# Patient Record
Sex: MALE | Race: WHITE | NOT HISPANIC OR LATINO | Employment: FULL TIME | URBAN - METROPOLITAN AREA
[De-identification: names, ages, dates, MRNs, and addresses within clinical notes are randomized per-mention and may not be internally consistent; named-entity substitution may affect disease eponyms.]

---

## 2019-03-29 ENCOUNTER — OFFICE VISIT (OUTPATIENT)
Dept: FAMILY MEDICINE CLINIC | Facility: CLINIC | Age: 24
End: 2019-03-29

## 2019-03-29 VITALS
DIASTOLIC BLOOD PRESSURE: 72 MMHG | HEIGHT: 74 IN | RESPIRATION RATE: 16 BRPM | SYSTOLIC BLOOD PRESSURE: 100 MMHG | OXYGEN SATURATION: 100 % | BODY MASS INDEX: 20.02 KG/M2 | WEIGHT: 156 LBS | TEMPERATURE: 97.7 F | HEART RATE: 95 BPM

## 2019-03-29 DIAGNOSIS — Q67.6 CONGENITAL PECTUS EXCAVATUM: ICD-10-CM

## 2019-03-29 DIAGNOSIS — Z00.00 ROUTINE GENERAL MEDICAL EXAMINATION AT A HEALTH CARE FACILITY: Primary | ICD-10-CM

## 2019-03-29 DIAGNOSIS — Z00.00 LABORATORY EXAMINATION ORDERED AS PART OF A ROUTINE GENERAL MEDICAL EXAMINATION: ICD-10-CM

## 2019-03-29 PROCEDURE — 99202 OFFICE O/P NEW SF 15 MIN: CPT | Performed by: PHYSICIAN ASSISTANT

## 2019-03-29 NOTE — PROGRESS NOTES
Subjective   Renaldo Worrell is a 23 y.o. male.     History of Present Illness   Renaldo Worrell 23 y.o. male who presents today for a new patient appointment.    he has a history of There is no problem list on file for this patient.  .  he is here to establish care I reviewed the PFSH recorded today by my MA/LPN staff.   he   He has been feeling well.      The following portions of the patient's history were reviewed and updated as appropriate: allergies, current medications, past family history, past medical history, past social history, past surgical history and problem list.    Review of Systems   Constitutional: Negative for activity change, appetite change and unexpected weight change.   HENT: Negative for nosebleeds and trouble swallowing.    Eyes: Negative for pain and visual disturbance.   Respiratory: Negative for chest tightness, shortness of breath and wheezing.    Cardiovascular: Negative for chest pain and palpitations.   Gastrointestinal: Negative for abdominal pain and blood in stool.   Endocrine: Negative.    Genitourinary: Negative for difficulty urinating and hematuria.   Musculoskeletal: Negative for joint swelling.   Skin: Negative for color change and rash.   Allergic/Immunologic: Negative.    Neurological: Negative for syncope and speech difficulty.   Hematological: Negative for adenopathy.   Psychiatric/Behavioral: Negative for agitation and confusion.   All other systems reviewed and are negative.      Objective   Physical Exam   Constitutional: He is oriented to person, place, and time. He appears well-developed and well-nourished. No distress.   HENT:   Head: Normocephalic and atraumatic.   Right Ear: External ear normal.   Left Ear: External ear normal.   Nose: Nose normal.   Mouth/Throat: Oropharynx is clear and moist. No oropharyngeal exudate.   Mild pnd   Eyes: Conjunctivae and EOM are normal. Pupils are equal, round, and reactive to light. Right eye exhibits no discharge. Left eye  exhibits no discharge. No scleral icterus.   Neck: Normal range of motion. Neck supple. No tracheal deviation present. No thyromegaly present.   Cardiovascular: Normal rate, regular rhythm, normal heart sounds, intact distal pulses and normal pulses. Exam reveals no gallop.   No murmur heard.  Pulmonary/Chest: Effort normal and breath sounds normal. No respiratory distress. He has no wheezes. He has no rales.   Abdominal: Soft. Bowel sounds are normal. There is no tenderness.   Musculoskeletal: Normal range of motion. He exhibits deformity. He exhibits no edema or tenderness.   Pectus excavatum     Lymphadenopathy:     He has no cervical adenopathy.   Neurological: He is alert and oriented to person, place, and time. He exhibits normal muscle tone. Coordination normal.   Skin: Skin is warm and dry. No rash noted. He is not diaphoretic. No erythema. No pallor.   Psychiatric: He has a normal mood and affect. His behavior is normal. Judgment and thought content normal.   Nursing note and vitals reviewed.      Assessment/Plan   Renaldo was seen today for establish care.    Diagnoses and all orders for this visit:    Routine general medical examination at a health care facility    Congenital pectus excavatum

## 2019-03-29 NOTE — PATIENT INSTRUCTIONS
Low glycemic index diet  Exercise 30 minutes most days of the week  Make sure you get results on any labs or tests we ordered today  We discussed medications and how to take them as prescribed  Sleep 6-8 hours each night if possible  If you have not signed up for [facility], please activate your code ASAP from your After Visit Summary today    LDL goal <100  LDL goal if heart disease <70  HDL goal >60  Triglyceride goal <150  BP goal =<130/80  Fasting glucose <100

## 2019-03-30 LAB
25(OH)D3+25(OH)D2 SERPL-MCNC: 32.6 NG/ML (ref 30–100)
ALBUMIN SERPL-MCNC: 5 G/DL (ref 3.5–5.2)
ALBUMIN/GLOB SERPL: 2.2 G/DL
ALP SERPL-CCNC: 59 U/L (ref 39–117)
ALT SERPL-CCNC: 14 U/L (ref 1–41)
AST SERPL-CCNC: 19 U/L (ref 1–40)
BASOPHILS # BLD AUTO: 0.02 10*3/MM3 (ref 0–0.2)
BASOPHILS NFR BLD AUTO: 0.5 % (ref 0–1.5)
BILIRUB SERPL-MCNC: 0.7 MG/DL (ref 0.2–1.2)
BUN SERPL-MCNC: 13 MG/DL (ref 6–20)
BUN/CREAT SERPL: 13.1 (ref 7–25)
CALCIUM SERPL-MCNC: 9.3 MG/DL (ref 8.6–10.5)
CHLORIDE SERPL-SCNC: 99 MMOL/L (ref 98–107)
CHOLEST SERPL-MCNC: 134 MG/DL (ref 0–200)
CO2 SERPL-SCNC: 26.9 MMOL/L (ref 22–29)
CREAT SERPL-MCNC: 0.99 MG/DL (ref 0.76–1.27)
EOSINOPHIL # BLD AUTO: 0.1 10*3/MM3 (ref 0–0.4)
EOSINOPHIL NFR BLD AUTO: 2.4 % (ref 0.3–6.2)
ERYTHROCYTE [DISTWIDTH] IN BLOOD BY AUTOMATED COUNT: 12.5 % (ref 12.3–15.4)
GLOBULIN SER CALC-MCNC: 2.3 GM/DL
GLUCOSE SERPL-MCNC: 93 MG/DL (ref 65–99)
HCT VFR BLD AUTO: 47.2 % (ref 37.5–51)
HDLC SERPL-MCNC: 49 MG/DL (ref 40–60)
HGB BLD-MCNC: 15.2 G/DL (ref 13–17.7)
IMM GRANULOCYTES # BLD AUTO: 0.01 10*3/MM3 (ref 0–0.05)
IMM GRANULOCYTES NFR BLD AUTO: 0.2 % (ref 0–0.5)
LDLC SERPL CALC-MCNC: 76 MG/DL (ref 0–100)
LYMPHOCYTES # BLD AUTO: 1.73 10*3/MM3 (ref 0.7–3.1)
LYMPHOCYTES NFR BLD AUTO: 41.4 % (ref 19.6–45.3)
MCH RBC QN AUTO: 30.9 PG (ref 26.6–33)
MCHC RBC AUTO-ENTMCNC: 32.2 G/DL (ref 31.5–35.7)
MCV RBC AUTO: 95.9 FL (ref 79–97)
MONOCYTES # BLD AUTO: 0.4 10*3/MM3 (ref 0.1–0.9)
MONOCYTES NFR BLD AUTO: 9.6 % (ref 5–12)
NEUTROPHILS # BLD AUTO: 1.92 10*3/MM3 (ref 1.4–7)
NEUTROPHILS NFR BLD AUTO: 45.9 % (ref 42.7–76)
NRBC BLD AUTO-RTO: 0 /100 WBC (ref 0–0)
PLATELET # BLD AUTO: 191 10*3/MM3 (ref 140–450)
POTASSIUM SERPL-SCNC: 4.5 MMOL/L (ref 3.5–5.2)
PROT SERPL-MCNC: 7.3 G/DL (ref 6–8.5)
RBC # BLD AUTO: 4.92 10*6/MM3 (ref 4.14–5.8)
SODIUM SERPL-SCNC: 139 MMOL/L (ref 136–145)
T3FREE SERPL-MCNC: 3.7 PG/ML (ref 2–4.4)
T4 FREE SERPL-MCNC: 1.67 NG/DL (ref 0.93–1.7)
TRIGL SERPL-MCNC: 47 MG/DL (ref 0–150)
TSH SERPL DL<=0.005 MIU/L-ACNC: 1.18 MIU/ML (ref 0.27–4.2)
UNABLE TO VOID: NORMAL
VLDLC SERPL CALC-MCNC: 9.4 MG/DL (ref 5–40)
WBC # BLD AUTO: 4.18 10*3/MM3 (ref 3.4–10.8)

## 2020-07-07 ENCOUNTER — HOSPITAL ENCOUNTER (EMERGENCY)
Facility: HOSPITAL | Age: 25
Discharge: HOME | End: 2020-07-07
Attending: EMERGENCY MEDICINE | Admitting: EMERGENCY MEDICINE

## 2020-07-07 VITALS
TEMPERATURE: 98.1 F | OXYGEN SATURATION: 99 % | SYSTOLIC BLOOD PRESSURE: 128 MMHG | HEART RATE: 113 BPM | RESPIRATION RATE: 12 BRPM | DIASTOLIC BLOOD PRESSURE: 81 MMHG

## 2020-07-07 DIAGNOSIS — S00.83XA CONTUSION OF FACE, INITIAL ENCOUNTER: Primary | ICD-10-CM

## 2020-07-07 PROCEDURE — 99282 EMERGENCY DEPT VISIT SF MDM: CPT

## 2020-07-07 NOTE — ED NOTES
Pt reports he was at work when he was starting a weed eater and hit himself in the face under his right eye. Pt reports his face started to mildly swell when he went home, and thought he should get it checked out. Pt wearing mask upon arrival to ER. Triage staff wearing PPE per protocol throughout pt encounter.      Domonique Michelle RN  07/07/20 [id number]

## 2020-07-07 NOTE — ED PROVIDER NOTES
EMERGENCY DEPARTMENT ENCOUNTER    Room Number:  Room/bed info not found  Date of encounter:  7/7/2020  PCP: Sharon Nuñez PA-C  Historian: Patient, mother      HPI:  Chief Complaint: Facial contusion  A complete HPI/ROS/PMH/PSH/SH/FH are unobtainable due to: None    Context: Renaldo Worrell is a 24 y.o. male who presents to the ED c/o contusion to the right face.  This occurred around 1 PM when he was trying to use a weedeater at work.  Pain is mild.  It is constant.  Worse with palpation.  No change in vision.  No loss consciousness.  Takes no anticoagulants.  No vomiting.        PAST MEDICAL HISTORY  Active Ambulatory Problems     Diagnosis Date Noted   • No Active Ambulatory Problems     Resolved Ambulatory Problems     Diagnosis Date Noted   • No Resolved Ambulatory Problems     No Additional Past Medical History         PAST SURGICAL HISTORY  No past surgical history on file.      FAMILY HISTORY  Family History   Problem Relation Age of Onset   • Hypertension Father    • Asthma Brother    • Diabetes Maternal Grandmother    • Depression Maternal Grandmother    • Asthma Brother          SOCIAL HISTORY  Social History     Socioeconomic History   • Marital status: Single     Spouse name: Not on file   • Number of children: Not on file   • Years of education: Not on file   • Highest education level: Not on file   Tobacco Use   • Smoking status: Never Smoker   • Smokeless tobacco: Never Used   Substance and Sexual Activity   • Alcohol use: Defer   • Drug use: No   • Sexual activity: Defer         ALLERGIES  Patient has no known allergies.        REVIEW OF SYSTEMS  Review of Systems     All systems reviewed and negative except for those discussed in HPI.       PHYSICAL EXAM    I have reviewed the triage vital signs and nursing notes.    ED Triage Vitals [07/07/20 1648]   Temp Heart Rate Resp BP SpO2   98.8 °F (37.1 °C) 113 14 128/81 99 %      Temp src Heart Rate Source Patient Position BP Location FiO2 (%)   Tympanic  Monitor -- -- --       Physical Exam  GENERAL: not distressed  HENT: nares patent, no bleeding to the naris, good jaw occlusion  EYES: no scleral icterus, EOMI, no diplopia, PERRL, right periocular ecchymosis  CV: regular rhythm, regular rate  RESPIRATORY: normal effort  ABDOMEN: soft  MUSCULOSKELETAL: no deformity  NEURO: alert, moves all extremities, follows commands  SKIN: warm, dry        LAB RESULTS  No results found for this or any previous visit (from the past 24 hour(s)).    Ordered the above labs and independently reviewed the results.        RADIOLOGY  No Radiology Exams Resulted Within Past 24 Hours    I ordered the above noted radiological studies. Reviewed by me and discussed with radiologist.  See dictation for official radiology interpretation.      PROCEDURES    Procedures      MEDICATIONS GIVEN IN ER    Medications - No data to display      PROGRESS, DATA ANALYSIS, CONSULTS, AND MEDICAL DECISION MAKING    All labs have been independently reviewed by me.  All radiology studies have been reviewed by me and discussed with radiologist dictating the report.   EKG's independently viewed and interpreted by me.  Discussion below represents my analysis of pertinent findings related to patient's condition, differential diagnosis, treatment plan and final disposition.    I offered the patient a CT scan but discussed with him the recommendations from [place] CT head criteria.  I also have low suspicion for any kind of orbital blowout fracture given the absence of diplopia and essentially no tenderness to palpation of his face.  He has normal jaw occlusion.    The patient's mother prefer that he have a CT scan.  However, the patient prefers avoiding CT scan and monitoring this.  I defer to the patient's autonomy.  Given good return precautions.           PPE: Both the patient and I wore a surgical mask throughout the entire patient encounter.     AS OF 17:13 VITALS:    BP - 128/81  HR - 113  TEMP - 98.8 °F  (37.1 °C) (Tympanic)  O2 SATS - 99%        DIAGNOSIS  Final diagnoses:   Contusion of face, initial encounter         DISPOSITION  DISCHARGE    FOLLOW-UP  Sharon Nuñez, PADianaC  [id number] [id number] [phone]    Schedule an appointment as soon as possible for a visit   As needed    [facility] Emergency Department  [address] [facility] [address] [phone]  Go to   Immediately if you develop any change in your vision, vomiting, or increased pain         Medication List      No changes were made to your prescriptions during this visit.                Raúl Nuñez II, MD  07/07/20 9158

## 2020-11-04 ENCOUNTER — OFFICE VISIT (OUTPATIENT)
Dept: FAMILY MEDICINE CLINIC | Facility: CLINIC | Age: 25
End: 2020-11-04

## 2020-11-04 VITALS
DIASTOLIC BLOOD PRESSURE: 70 MMHG | HEIGHT: 73 IN | TEMPERATURE: 97.8 F | OXYGEN SATURATION: 99 % | BODY MASS INDEX: 20.94 KG/M2 | RESPIRATION RATE: 16 BRPM | WEIGHT: 158 LBS | HEART RATE: 129 BPM | SYSTOLIC BLOOD PRESSURE: 100 MMHG

## 2020-11-04 DIAGNOSIS — F33.0 MILD EPISODE OF RECURRENT MAJOR DEPRESSIVE DISORDER (HCC): Primary | ICD-10-CM

## 2020-11-04 DIAGNOSIS — Z23 NEED FOR INFLUENZA VACCINATION: ICD-10-CM

## 2020-11-04 DIAGNOSIS — F41.1 GENERALIZED ANXIETY DISORDER: ICD-10-CM

## 2020-11-04 PROCEDURE — 90471 IMMUNIZATION ADMIN: CPT | Performed by: PHYSICIAN ASSISTANT

## 2020-11-04 PROCEDURE — 90686 IIV4 VACC NO PRSV 0.5 ML IM: CPT | Performed by: PHYSICIAN ASSISTANT

## 2020-11-04 PROCEDURE — 99213 OFFICE O/P EST LOW 20 MIN: CPT | Performed by: PHYSICIAN ASSISTANT

## 2020-11-04 RX ORDER — ESCITALOPRAM OXALATE 10 MG/1
TABLET ORAL
Qty: 30 TABLET | Refills: 0 | Status: SHIPPED | OUTPATIENT
Start: 2020-11-04 | End: 2020-12-02

## 2020-11-04 NOTE — PATIENT INSTRUCTIONS
Escitalopram tablets  What is this medicine?  ESCITALOPRAM (es sye LUKE oh pram) is used to treat depression and certain types of anxiety.  This medicine may be used for other purposes; ask your health care provider or pharmacist if you have questions.  COMMON BRAND NAME(S): Lexapro  What should I tell my health care provider before I take this medicine?  They need to know if you have any of these conditions:  · bipolar disorder or a family history of bipolar disorder  · diabetes  · glaucoma  · heart disease  · kidney or liver disease  · receiving electroconvulsive therapy  · seizures (convulsions)  · suicidal thoughts, plans, or attempt by you or a family member  · an unusual or allergic reaction to escitalopram, the related drug citalopram, other medicines, foods, dyes, or preservatives  · pregnant or trying to become pregnant  · breast-feeding  How should I use this medicine?  Take this medicine by mouth with a glass of water. Follow the directions on the prescription label. You can take it with or without food. If it upsets your stomach, take it with food. Take your medicine at regular intervals. Do not take it more often than directed. Do not stop taking this medicine suddenly except upon the advice of your doctor. Stopping this medicine too quickly may cause serious side effects or your condition may worsen.  A special MedGuide will be given to you by the pharmacist with each prescription and refill. Be sure to read this information carefully each time.  Talk to your pediatrician regarding the use of this medicine in children. Special care may be needed.  Overdosage: If you think you have taken too much of this medicine contact a poison control center or emergency room at once.  NOTE: This medicine is only for you. Do not share this medicine with others.  What if I miss a dose?  If you miss a dose, take it as soon as you can. If it is almost time for your next dose, take only that dose. Do not take double or  extra doses.  What may interact with this medicine?  Do not take this medicine with any of the following medications:  · certain medicines for fungal infections like fluconazole, itraconazole, ketoconazole, posaconazole, voriconazole  · cisapride  · citalopram  · dronedarone  · linezolid  · MAOIs like Carbex, Eldepryl, Marplan, Nardil, and Parnate  · methylene blue (injected into a vein)  · pimozide  · thioridazine  This medicine may also interact with the following medications:  · alcohol  · amphetamines  · aspirin and aspirin-like medicines  · carbamazepine  · certain medicines for depression, anxiety, or psychotic disturbances  · certain medicines for migraine headache like almotriptan, eletriptan, frovatriptan, naratriptan, rizatriptan, sumatriptan, zolmitriptan  · certain medicines for sleep  · certain medicines that treat or prevent blood clots like warfarin, enoxaparin, dalteparin  · cimetidine  · diuretics  · dofetilide  · fentanyl  · furazolidone  · isoniazid  · lithium  · metoprolol  · NSAIDs, medicines for pain and inflammation, like ibuprofen or naproxen  · other medicines that prolong the QT interval (cause an abnormal heart rhythm)  · procarbazine  · rasagiline  · supplements like [place]'s wort, kava kava, valerian  · tramadol  · tryptophan  · ziprasidone  This list may not describe all possible interactions. Give your health care provider a list of all the medicines, herbs, non-prescription drugs, or dietary supplements you use. Also tell them if you smoke, drink alcohol, or use illegal drugs. Some items may interact with your medicine.  What should I watch for while using this medicine?  Tell your doctor if your symptoms do not get better or if they get worse. Visit your doctor or health care professional for regular checks on your progress. Because it may take several weeks to see the full effects of this medicine, it is important to continue your treatment as prescribed by your doctor.  Patients  and their families should watch out for new or worsening thoughts of suicide or depression. Also watch out for sudden changes in feelings such as feeling anxious, agitated, panicky, irritable, hostile, aggressive, impulsive, severely restless, overly excited and hyperactive, or not being able to sleep. If this happens, especially at the beginning of treatment or after a change in dose, call your health care professional.  You may get drowsy or dizzy. Do not drive, use machinery, or do anything that needs mental alertness until you know how this medicine affects you. Do not stand or sit up quickly, especially if you are an older patient. This reduces the risk of dizzy or fainting spells. Alcohol may interfere with the effect of this medicine. Avoid alcoholic drinks.  Your mouth may get dry. Chewing sugarless gum or sucking hard candy, and drinking plenty of water may help. Contact your doctor if the problem does not go away or is severe.  What side effects may I notice from receiving this medicine?  Side effects that you should report to your doctor or health care professional as soon as possible:  · allergic reactions like skin rash, itching or hives, swelling of the face, lips, or tongue  · anxious  · black, tarry stools  · changes in vision  · confusion  · elevated mood, decreased need for sleep, racing thoughts, impulsive behavior  · eye pain  · fast, irregular heartbeat  · feeling faint or lightheaded, falls  · feeling agitated, angry, or irritable  · hallucination, loss of contact with reality  · loss of balance or coordination  · loss of memory  · painful or prolonged erections  · restlessness, pacing, inability to keep still  · seizures  · stiff muscles  · suicidal thoughts or other mood changes  · trouble sleeping  · unusual bleeding or bruising  · unusually weak or tired  · vomiting  Side effects that usually do not require medical attention (report to your doctor or health care professional if they  continue or are bothersome):  · changes in appetite  · change in sex drive or performance  · headache  · increased sweating  · indigestion, nausea  · tremors  This list may not describe all possible side effects. Call your doctor for medical advice about side effects. You may report side effects to FDA at 8-578-ALI-1339.  Where should I keep my medicine?  Keep out of reach of children.  Store at room temperature between 15 and 30 degrees C (59 and 86 degrees F). Throw away any unused medicine after the expiration date.  NOTE: This sheet is a summary. It may not cover all possible information. If you have questions about this medicine, talk to your doctor, pharmacist, or health care provider.  © 2020 [employer] (2019-12-09 11:21:44)

## 2020-11-04 NOTE — PROGRESS NOTES
"Subjective   Renaldo Worrell is a 25 y.o. male.     History of Present Illness   Renaldo Worrell male 25 y.o., /70 (BP Location: Left arm, Patient Position: Sitting, Cuff Size: Adult)   Pulse (!) 129   Temp 97.8 °F (36.6 °C)   Resp 16   Ht 185.4 cm (73\")   Wt 71.7 kg (158 lb)   SpO2 99%   BMI 20.85 kg/m²   who presents today for new evaluation and treatment of Depression and Anxiety.  He reports depressed mood, difficulty falling asleep, fatigue, anxiety, anhedonia, impaired concentration, excessive worry, unable to relax, panic feeling, irritable, sleep disturbance and social anxiety. Onset of symptoms was approximately several years ago.  He denies current suicidal and homicidal ideation. Risk factors are family history of anxiety and or depression and lifestyle of multiple roles.  Previous treatment includes none.  He complains of the following medication side effects: none.  The patient has previously been in counseling..  I do see patients younger brother and he is done very well on Lexapro.  Talked about his extensive family history of anxiety and depression.  Patient's had this for years and this is the first time he is sought treatment.  Patient's wife is very supportive and encouraged him to come in today  Lab Results   Component Value Date    BUN 13 03/29/2019    CREATININE 0.99 03/29/2019    EGFRIFNONA 95 03/29/2019    EGFRIFAFRI 115 03/29/2019    BCR 13.1 03/29/2019    K 4.5 03/29/2019    CO2 26.9 03/29/2019    CALCIUM 9.3 03/29/2019    PROTENTOTREF 7.3 03/29/2019    ALBUMIN 5.00 03/29/2019    LABIL2 2.2 03/29/2019    AST 19 03/29/2019    ALT 14 03/29/2019     Lab Results   Component Value Date    TSH 1.180 03/29/2019       The following portions of the patient's history were reviewed and updated as appropriate: allergies, current medications, past family history, past medical history, past social history, past surgical history and problem list.    Review of Systems   Constitutional: Positive " for fatigue. Negative for activity change, appetite change and unexpected weight change.   HENT: Negative for nosebleeds and trouble swallowing.    Eyes: Negative for pain and visual disturbance.   Respiratory: Negative for chest tightness, shortness of breath and wheezing.    Cardiovascular: Negative for chest pain and palpitations.   Gastrointestinal: Negative for abdominal pain and blood in stool.   Endocrine: Negative.    Genitourinary: Negative for difficulty urinating and hematuria.   Musculoskeletal: Positive for arthralgias. Negative for joint swelling.   Skin: Negative for color change and rash.   Allergic/Immunologic: Negative.    Neurological: Negative for syncope and speech difficulty.   Hematological: Negative for adenopathy.   Psychiatric/Behavioral: Positive for agitation and dysphoric mood. Negative for confusion. The patient is nervous/anxious.    All other systems reviewed and are negative.      Objective   Physical Exam  Vitals signs and nursing note reviewed.   Constitutional:       General: He is not in acute distress.     Appearance: He is well-developed. He is not diaphoretic.   HENT:      Head: Normocephalic.      Right Ear: External ear normal.      Left Ear: External ear normal.      Nose: Nose normal.   Eyes:      Conjunctiva/sclera: Conjunctivae normal.      Pupils: Pupils are equal, round, and reactive to light.   Neck:      Musculoskeletal: Normal range of motion and neck supple.   Cardiovascular:      Rate and Rhythm: Normal rate and regular rhythm.      Heart sounds: Normal heart sounds. No murmur.   Pulmonary:      Effort: Pulmonary effort is normal.      Breath sounds: Normal breath sounds.   Musculoskeletal: Normal range of motion.      Right lower leg: No edema.      Left lower leg: No edema.   Skin:     General: Skin is warm and dry.      Coloration: Skin is not jaundiced.      Findings: No rash.   Neurological:      General: No focal deficit present.      Mental Status: He is  alert and oriented to person, place, and time.      Sensory: No sensory deficit.   Psychiatric:         Mood and Affect: Affect is not inappropriate.         Behavior: Behavior normal.         Thought Content: Thought content normal.         Judgment: Judgment normal.         Assessment/Plan   Diagnoses and all orders for this visit:    1. Mild episode of recurrent major depressive disorder (CMS/HCC) (Primary)    2. Generalized anxiety disorder    Other orders  -     escitalopram (LEXAPRO) 10 MG tablet; Start with 1/2 tab PO daily for 4 days then one po daily for stress  Dispense: 30 tablet; Refill: 0      Talked about mechanism of action and how to take Lexapro and to email me at any time if concerns about medication and adverse effects  Patient to do follow-up 3 to 4 weeks  Also consider trying l-methylfolate due to family history of depression

## 2020-12-02 ENCOUNTER — OFFICE VISIT (OUTPATIENT)
Dept: FAMILY MEDICINE CLINIC | Facility: CLINIC | Age: 25
End: 2020-12-02

## 2020-12-02 VITALS
SYSTOLIC BLOOD PRESSURE: 120 MMHG | DIASTOLIC BLOOD PRESSURE: 80 MMHG | HEART RATE: 99 BPM | WEIGHT: 156 LBS | TEMPERATURE: 98 F | HEIGHT: 73 IN | BODY MASS INDEX: 20.67 KG/M2

## 2020-12-02 DIAGNOSIS — S96.911A STRAIN OF RIGHT FOOT, INITIAL ENCOUNTER: ICD-10-CM

## 2020-12-02 DIAGNOSIS — F41.1 GENERALIZED ANXIETY DISORDER: Primary | ICD-10-CM

## 2020-12-02 DIAGNOSIS — F33.0 MILD EPISODE OF RECURRENT MAJOR DEPRESSIVE DISORDER (HCC): ICD-10-CM

## 2020-12-02 PROCEDURE — 99213 OFFICE O/P EST LOW 20 MIN: CPT | Performed by: PHYSICIAN ASSISTANT

## 2020-12-02 RX ORDER — ESCITALOPRAM OXALATE 20 MG/1
20 TABLET ORAL DAILY
Qty: 30 TABLET | Refills: 1 | Status: SHIPPED | OUTPATIENT
Start: 2020-12-02 | End: 2020-12-30 | Stop reason: SDUPTHER

## 2020-12-02 NOTE — PROGRESS NOTES
"Subjective   Renaldo Worrell is a 25 y.o. male.     History of Present Illness   Renaldo Worrell male 25 y.o., /80   Pulse 99   Temp 98 °F (36.7 °C)   Ht 185.4 cm (72.99\")   Wt 70.8 kg (156 lb)   BMI 20.59 kg/m²   who presents today for follow up of Depression, Anxiety and social anxiety.  He reports he did start taking the Lexapro and this is the first medication he has ever been on for his anxiety depression and social anxiety.  Patient notes dramatic improvement in anxiety and even social anxiety, he noted more enjoyment in activities and happier mood when he first started taking the Lexapro.  He notes concentration has improved sleep is still excellent.  Patient feels like his mood overall has improved but is getting some breakthrough depression in the last week.  He does want to increase the dose of Lexapro to like it is helped his anxiety. . Onset of symptoms was approximately several years ago.  He denies current suicidal and homicidal ideation. Risk factors are family history of anxiety and or depression and lifestyle of multiple roles.  Previous treatment includes none.  He complains of the following medication side effects: none.  The patient declines to go to counseling..    Patient also mentions he went hiking over the weekend and did strain his left foot.  He is not point tender to touch there is no edema there is no bruising no direct injury he was walking on uneven surfaces.  Has pain proximal fifth metatarsal right foot pain to weight-bear and with walking patient using rest ice elevation and a cane to take pressure off of that part of his foot.  No pain if he walks on the ball of his foot or the inside of his foot.  Patient declines x-ray to rule out fracture    The following portions of the patient's history were reviewed and updated as appropriate: allergies, current medications, past family history, past medical history, past social history, past surgical history and problem " list.    Review of Systems   Constitutional: Negative for activity change, appetite change and unexpected weight change.   HENT: Negative for nosebleeds and trouble swallowing.    Eyes: Negative for pain and visual disturbance.   Respiratory: Negative for chest tightness, shortness of breath and wheezing.    Cardiovascular: Negative for chest pain and palpitations.   Gastrointestinal: Negative for abdominal pain and blood in stool.   Endocrine: Negative.    Genitourinary: Negative for difficulty urinating and hematuria.   Musculoskeletal: Negative for joint swelling.   Skin: Negative for color change and rash.   Allergic/Immunologic: Negative.    Neurological: Negative for syncope and speech difficulty.   Hematological: Negative for adenopathy.   Psychiatric/Behavioral: Positive for dysphoric mood (better). Negative for agitation, confusion and sleep disturbance. The patient is not nervous/anxious.    All other systems reviewed and are negative.      Objective   Physical Exam  Vitals signs and nursing note reviewed.   Constitutional:       General: He is not in acute distress.     Appearance: Normal appearance. He is well-developed. He is not ill-appearing, toxic-appearing or diaphoretic.   HENT:      Head: Normocephalic.   Eyes:      Conjunctiva/sclera: Conjunctivae normal.      Pupils: Pupils are equal, round, and reactive to light.   Neck:      Musculoskeletal: Normal range of motion and neck supple.   Cardiovascular:      Rate and Rhythm: Normal rate and regular rhythm.      Pulses: Normal pulses.      Heart sounds: Normal heart sounds. No murmur.   Pulmonary:      Effort: Pulmonary effort is normal.      Breath sounds: Normal breath sounds. No stridor.   Musculoskeletal: Normal range of motion.         General: No tenderness or deformity.      Comments: Right proximal base of fifth metatarsal is where patient has pain with weightbearing.  No pain to palpate area on exam.  No redness, not hot to touch.  Does  have some range of motion pain   Skin:     General: Skin is warm and dry.      Coloration: Skin is not jaundiced.      Findings: No rash.   Neurological:      Mental Status: He is alert and oriented to person, place, and time.      Sensory: No sensory deficit.      Motor: No weakness.      Gait: Gait abnormal.   Psychiatric:         Mood and Affect: Affect is not inappropriate.         Behavior: Behavior normal.         Thought Content: Thought content normal.         Judgment: Judgment normal.         Assessment/Plan   Diagnoses and all orders for this visit:    1. Generalized anxiety disorder (Primary)    2. Mild episode of recurrent major depressive disorder (CMS/HCC)    3. Strain of right foot, initial encounter    Other orders  -     escitalopram (Lexapro) 20 MG tablet; Take 1 tablet by mouth Daily. For stress--new dose  Dispense: 30 tablet; Refill: 1    raise dose Lexapro-is helping anxiety and depression although getting some breakthrough depression will increase dose follow-up in a few weeks  Did suggest patient have x-ray of his right foot and he declined  RICE  Also consider postop shoe  Concern if continue to have pain could be stress fracture patient aware and knows to follow-up  Can take Advil and stop if GI upset

## 2020-12-20 RX ORDER — ESCITALOPRAM OXALATE 10 MG/1
20 TABLET ORAL DAILY
Qty: 30 TABLET | Refills: 0 | Status: SHIPPED | OUTPATIENT
Start: 2020-12-20 | End: 2020-12-30

## 2020-12-30 RX ORDER — ESCITALOPRAM OXALATE 20 MG/1
20 TABLET ORAL DAILY
Qty: 30 TABLET | Refills: 1 | Status: SHIPPED | OUTPATIENT
Start: 2020-12-30 | End: 2021-04-02 | Stop reason: SDUPTHER

## 2021-04-02 RX ORDER — ESCITALOPRAM OXALATE 20 MG/1
20 TABLET ORAL DAILY
Qty: 30 TABLET | Refills: 1 | Status: SHIPPED | OUTPATIENT
Start: 2021-04-02 | End: 2021-04-19 | Stop reason: SDUPTHER

## 2021-04-16 ENCOUNTER — BULK ORDERING (OUTPATIENT)
Dept: CASE MANAGEMENT | Facility: OTHER | Age: 26
End: 2021-04-16

## 2021-04-16 DIAGNOSIS — Z23 IMMUNIZATION DUE: ICD-10-CM

## 2021-04-19 ENCOUNTER — OFFICE VISIT (OUTPATIENT)
Dept: FAMILY MEDICINE CLINIC | Facility: CLINIC | Age: 26
End: 2021-04-19

## 2021-04-19 VITALS
SYSTOLIC BLOOD PRESSURE: 110 MMHG | RESPIRATION RATE: 16 BRPM | WEIGHT: 161 LBS | BODY MASS INDEX: 21.34 KG/M2 | TEMPERATURE: 97.5 F | HEART RATE: 95 BPM | OXYGEN SATURATION: 100 % | DIASTOLIC BLOOD PRESSURE: 79 MMHG | HEIGHT: 73 IN

## 2021-04-19 DIAGNOSIS — F41.1 GENERALIZED ANXIETY DISORDER: ICD-10-CM

## 2021-04-19 DIAGNOSIS — F33.42 RECURRENT MAJOR DEPRESSIVE DISORDER, IN FULL REMISSION (HCC): ICD-10-CM

## 2021-04-19 DIAGNOSIS — D22.9 ATYPICAL NEVI: Primary | ICD-10-CM

## 2021-04-19 PROCEDURE — 99213 OFFICE O/P EST LOW 20 MIN: CPT | Performed by: PHYSICIAN ASSISTANT

## 2021-04-19 RX ORDER — ESCITALOPRAM OXALATE 20 MG/1
20 TABLET ORAL DAILY
Qty: 90 TABLET | Refills: 1 | Status: SHIPPED | OUTPATIENT
Start: 2021-04-19 | End: 2021-08-03 | Stop reason: SDUPTHER

## 2021-04-19 NOTE — PROGRESS NOTES
"Subjective   Renaldo Worrell is a 25 y.o. male.     History of Present Illness   Renaldo Worrell 25 y.o. male /79 (BP Location: Right arm, Patient Position: Sitting, Cuff Size: Adult)   Pulse 95   Temp 97.5 °F (36.4 °C)   Resp 16   Ht 185.4 cm (72.99\")   Wt 73 kg (161 lb)   SpO2 100%   BMI 21.25 kg/m²  who presents today for nevi check.  Patient had blood blisterlike nevi medial aspect of his mid left buttocks 2 months ago in this area ruptured and now has a irregular nevi with 2 shades of brown and dark brown almost black with irregular shape and want to refer to dermatology  he has a history of   Patient Active Problem List   Diagnosis   • Mild episode of recurrent major depressive disorder (CMS/HCC)   • Generalized anxiety disorder   .      Left buttock mole---was larger and ruptured 2 mos ago and has irreg nevi remaining.---refer to derm;     Recently saw him and already better with anxiety and depression---helping at higher dose!! Some gas; will keep 20mg dose.  Will note depression in remission with Lexapro 20 mg.  The following portions of the patient's history were reviewed and updated as appropriate: allergies, current medications, past family history, past medical history, past social history, past surgical history and problem list.    Review of Systems   Constitutional: Negative for fever.   HENT: Negative for nosebleeds and trouble swallowing.    Eyes: Negative for visual disturbance.   Respiratory: Negative for choking and stridor.    Cardiovascular: Negative for chest pain.   Gastrointestinal: Negative for blood in stool.   Endocrine: Negative for polydipsia.   Genitourinary: Negative for genital sores and hematuria.   Musculoskeletal: Negative for joint swelling.   Skin: Positive for color change. Negative for rash.   Allergic/Immunologic: Negative for immunocompromised state.   Neurological: Negative for seizures, facial asymmetry and speech difficulty.   Hematological: Negative for " adenopathy.   Psychiatric/Behavioral: Negative for behavioral problems, dysphoric mood, self-injury and suicidal ideas. The patient is not nervous/anxious.        Objective   Physical Exam  Vitals and nursing note reviewed.   Constitutional:       General: He is not in acute distress.     Appearance: He is well-developed. He is not diaphoretic.   HENT:      Head: Normocephalic.   Eyes:      Conjunctiva/sclera: Conjunctivae normal.      Pupils: Pupils are equal, round, and reactive to light.   Cardiovascular:      Heart sounds: No murmur heard.     Pulmonary:      Effort: Pulmonary effort is normal.   Musculoskeletal:         General: Normal range of motion.      Cervical back: Normal range of motion.   Skin:     General: Skin is warm and dry.      Findings: Lesion present. No rash.      Comments: Benign-appearing brown macules right shoulder    Irregular shaped nevi mid left buttock --concern with 2 shades of dark brown and irregular in shape refer to dermatology   Neurological:      Mental Status: He is alert and oriented to person, place, and time.   Psychiatric:         Mood and Affect: Mood normal. Affect is not inappropriate.         Behavior: Behavior normal.         Thought Content: Thought content normal.         Judgment: Judgment normal.         Assessment/Plan   Diagnoses and all orders for this visit:    1. Atypical nevi (Primary)  -     Ambulatory Referral to Dermatology    2. Recurrent major depressive disorder, in full remission (CMS/HCC)    3. Generalized anxiety disorder    Other orders  -     escitalopram (Lexapro) 20 MG tablet; Take 1 tablet by mouth Daily. For stress--new dose  Dispense: 90 tablet; Refill: 1      Photo moles yearly  Refer to derm  Cont Lexapro at 20mg

## 2021-08-04 RX ORDER — ESCITALOPRAM OXALATE 20 MG/1
20 TABLET ORAL DAILY
Qty: 90 TABLET | Refills: 0 | Status: SHIPPED | OUTPATIENT
Start: 2021-08-04 | End: 2021-11-03 | Stop reason: SDUPTHER

## 2021-11-03 RX ORDER — ESCITALOPRAM OXALATE 20 MG/1
20 TABLET ORAL DAILY
Qty: 30 TABLET | Refills: 0 | Status: SHIPPED | OUTPATIENT
Start: 2021-11-03 | End: 2021-12-04 | Stop reason: SDUPTHER

## 2021-12-04 RX ORDER — ESCITALOPRAM OXALATE 20 MG/1
20 TABLET ORAL DAILY
Qty: 30 TABLET | Refills: 0 | Status: SHIPPED | OUTPATIENT
Start: 2021-12-04 | End: 2022-01-03 | Stop reason: SDUPTHER

## 2022-01-03 RX ORDER — ESCITALOPRAM OXALATE 20 MG/1
20 TABLET ORAL DAILY
Qty: 30 TABLET | Refills: 0 | Status: SHIPPED | OUTPATIENT
Start: 2022-01-03 | End: 2022-01-04 | Stop reason: SDUPTHER

## 2022-01-04 ENCOUNTER — OFFICE VISIT (OUTPATIENT)
Dept: FAMILY MEDICINE CLINIC | Facility: CLINIC | Age: 27
End: 2022-01-04

## 2022-01-04 VITALS
BODY MASS INDEX: 23.75 KG/M2 | HEIGHT: 73 IN | OXYGEN SATURATION: 100 % | SYSTOLIC BLOOD PRESSURE: 120 MMHG | TEMPERATURE: 97.5 F | DIASTOLIC BLOOD PRESSURE: 80 MMHG | RESPIRATION RATE: 16 BRPM | WEIGHT: 179.2 LBS | HEART RATE: 96 BPM

## 2022-01-04 DIAGNOSIS — F33.0 MILD EPISODE OF RECURRENT MAJOR DEPRESSIVE DISORDER: Primary | ICD-10-CM

## 2022-01-04 DIAGNOSIS — F41.1 GENERALIZED ANXIETY DISORDER: ICD-10-CM

## 2022-01-04 PROCEDURE — 99213 OFFICE O/P EST LOW 20 MIN: CPT | Performed by: PHYSICIAN ASSISTANT

## 2022-01-04 RX ORDER — ESCITALOPRAM OXALATE 20 MG/1
20 TABLET ORAL DAILY
Qty: 90 TABLET | Refills: 3 | Status: SHIPPED | OUTPATIENT
Start: 2022-01-04 | End: 2022-02-03 | Stop reason: SDUPTHER

## 2022-01-04 NOTE — PATIENT INSTRUCTIONS
"[email or web address]   Generalized Anxiety Disorder, Adult  Generalized anxiety disorder (NGUYEN) is a mental health condition. Unlike normal worries, anxiety related to NGUYEN is not triggered by a specific event. These worries do not fade or get better with time. NGUYEN interferes with relationships, work, and school.  NGUYEN symptoms can vary from mild to severe. People with severe NGUYEN can have intense waves of anxiety with physical symptoms that are similar to panic attacks.  What are the causes?  The exact cause of NGUYEN is not known, but the following are believed to have an impact:  · Differences in natural brain chemicals.  · Genes passed down from parents to children.  · Differences in the way threats are perceived.  · Development during childhood.  · Personality.  What increases the risk?  The following factors may make you more likely to develop this condition:  · Being female.  · Having a family history of anxiety disorders.  · Being very shy.  · Experiencing very stressful life events, such as the death of a loved one.  · Having a very stressful family environment.  What are the signs or symptoms?  People with NGUYEN often worry excessively about many things in their lives, such as their health and family. Symptoms may also include:  · Mental and emotional symptoms:  ? Worrying excessively about natural disasters.  ? Fear of being late.  ? Difficulty concentrating.  ? Fears that others are judging your performance.  · Physical symptoms:  ? Fatigue.  ? Headaches, muscle tension, muscle twitches, trembling, or feeling shaky.  ? Feeling like your heart is pounding or beating very fast.  ? Feeling out of breath or like you cannot take a deep breath.  ? Having trouble falling asleep or staying asleep, or experiencing restlessness.  ? Sweating.  ? Nausea, diarrhea, or irritable bowel syndrome (IBS).  · Behavioral symptoms:  ? Experiencing erratic moods or irritability.  ? Avoidance of new situations.  ? Avoidance of " people.  ? Extreme difficulty making decisions.  How is this diagnosed?  This condition is diagnosed based on your symptoms and medical history. You will also have a physical exam. Your health care provider may perform tests to rule out other possible causes of your symptoms.  To be diagnosed with NGUYEN, a person must have anxiety that:  · Is out of his or her control.  · Affects several different aspects of his or her life, such as work and relationships.  · Causes distress that makes him or her unable to take part in normal activities.  · Includes at least three symptoms of NGUYEN, such as restlessness, fatigue, trouble concentrating, irritability, muscle tension, or sleep problems.  Before your health care provider can confirm a diagnosis of NGUYEN, these symptoms must be present more days than they are not, and they must last for 6 months or longer.  How is this treated?  This condition may be treated with:  · Medicine. Antidepressant medicine is usually prescribed for long-term daily control. Anti-anxiety medicines may be added in severe cases, especially when panic attacks occur.  · Talk therapy (psychotherapy). Certain types of talk therapy can be helpful in treating NGUYEN by providing support, education, and guidance. Options include:  ? Cognitive behavioral therapy (CBT). People learn coping skills and self-calming techniques to ease their physical symptoms. They learn to identify unrealistic thoughts and behaviors and to replace them with more appropriate thoughts and behaviors.  ? Acceptance and commitment therapy (ACT). This treatment teaches people how to be mindful as a way to cope with unwanted thoughts and feelings.  ? Biofeedback. This process trains you to manage your body's response (physiological response) through breathing techniques and relaxation methods. You will work with a therapist while machines are used to monitor your physical symptoms.  · Stress management techniques. These include yoga,  meditation, and exercise.  A mental health specialist can help determine which treatment is best for you. Some people see improvement with one type of therapy. However, other people require a combination of therapies.  Follow these instructions at home:  Lifestyle  · Maintain a consistent routine and schedule.  · Anticipate stressful situations. Create a plan, and allow extra time to work with your plan.  · Practice stress management or self-calming techniques that you have learned from your therapist or your health care provider.  General instructions  · Take over-the-counter and prescription medicines only as told by your health care provider.  · Understand that you are likely to have setbacks. Accept this and be kind to yourself as you persist to take better care of yourself.  · Recognize and accept your accomplishments, even if you  them as small.  · Keep all follow-up visits as told by your health care provider. This is important.  Contact a health care provider if:  · Your symptoms do not get better.  · Your symptoms get worse.  · You have signs of depression, such as:  ? A persistently sad or irritable mood.  ? Loss of enjoyment in activities that used to bring you suzie.  ? Change in weight or eating.  ? Changes in sleeping habits.  ? Avoiding friends or family members.  ? Loss of energy for normal tasks.  ? Feelings of guilt or worthlessness.  Get help right away if:  · You have serious thoughts about hurting yourself or others.  If you ever feel like you may hurt yourself or others, or have thoughts about taking your own life, get help right away. Go to your nearest emergency department or:  · Call your local emergency services (491 in the [place]  · Call a suicide crisis helpline, such as the National Suicide Prevention Lifeline at 1-663.716.6849. This is open 24 hours a day in the [place]  · Text the Crisis Text Line at 450118 (in the [place]  Summary  · Generalized anxiety disorder (NGUYEN) is a mental  health condition that involves worry that is not triggered by a specific event.  · People with NGUYEN often worry excessively about many things in their lives, such as their health and family.  · NGUYEN may cause symptoms such as restlessness, trouble concentrating, sleep problems, frequent sweating, nausea, diarrhea, headaches, and trembling or muscle twitching.  · A mental health specialist can help determine which treatment is best for you. Some people see improvement with one type of therapy. However, other people require a combination of therapies.  This information is not intended to replace advice given to you by your health care provider. Make sure you discuss any questions you have with your health care provider.  Document Revised: 10/07/2020 Document Reviewed: 10/07/2020  Elsevier Patient Education © 2021 [employer]

## 2022-01-04 NOTE — PROGRESS NOTES
"Subjective   Renaldo Worrell is a 26 y.o. male.     History of Present Illness     Renaldo Worrell male 26 y.o., /80 (BP Location: Left arm, Patient Position: Sitting, Cuff Size: Adult)   Pulse 96   Temp 97.5 °F (36.4 °C)   Resp 16   Ht 185.4 cm (72.99\")   Wt 81.3 kg (179 lb 3.2 oz)   SpO2 100%   BMI 23.65 kg/m²   who presents today for follow up of Depression and Anxiety.  He reports medication is working well, patient desires to continue on Rx, and needs refill. Onset of symptoms was approximately several years ago. He denies current suicidal and homicidal ideation. Risk factors are family history of anxiety and or depression and lifestyle of multiple roles.  Previous treatment includes current Rx. He complains of the following medication side effects:none. The patient has not previously been in counseling..    Went up on dose Dec 2021---helping  Mole was blood blister--neg;  Saw derm  Weight up some; exercise  The following portions of the patient's history were reviewed and updated as appropriate: allergies, current medications, past family history, past medical history, past social history, past surgical history and problem list.    Review of Systems   Constitutional: Negative for activity change, appetite change and unexpected weight change.   HENT: Negative for nosebleeds and trouble swallowing.    Eyes: Negative for pain and visual disturbance.   Respiratory: Negative for chest tightness, shortness of breath and wheezing.    Cardiovascular: Negative for chest pain and palpitations.   Gastrointestinal: Negative for abdominal pain and blood in stool.   Endocrine: Negative.    Genitourinary: Negative for difficulty urinating and hematuria.   Musculoskeletal: Negative for joint swelling.   Skin: Negative for color change and rash.   Allergic/Immunologic: Negative.    Neurological: Negative for syncope and speech difficulty.   Hematological: Negative for adenopathy.   Psychiatric/Behavioral: Negative " for agitation, confusion, dysphoric mood, self-injury and sleep disturbance.   All other systems reviewed and are negative.      Objective   Physical Exam  Vitals and nursing note reviewed.   Constitutional:       General: He is not in acute distress.     Appearance: He is well-developed. He is not diaphoretic.   HENT:      Head: Normocephalic.   Eyes:      Conjunctiva/sclera: Conjunctivae normal.      Pupils: Pupils are equal, round, and reactive to light.   Cardiovascular:      Rate and Rhythm: Normal rate and regular rhythm.      Heart sounds: Normal heart sounds. No murmur heard.      Pulmonary:      Effort: Pulmonary effort is normal.   Musculoskeletal:         General: Normal range of motion.      Cervical back: Normal range of motion and neck supple.   Skin:     General: Skin is warm and dry.      Findings: No rash.   Neurological:      Mental Status: He is alert and oriented to person, place, and time.   Psychiatric:         Mood and Affect: Affect is not inappropriate.         Behavior: Behavior normal.         Thought Content: Thought content normal.         Judgment: Judgment normal.         Assessment/Plan   Diagnoses and all orders for this visit:    1. Mild episode of recurrent major depressive disorder (HCC) (Primary)    2. Generalized anxiety disorder    Other orders  -     escitalopram (Lexapro) 20 MG tablet; Take 1 tablet by mouth Daily. For stress-  Dispense: 90 tablet; Refill: 3    Lexapro working great; controls anxiety and depression

## 2022-02-04 RX ORDER — ESCITALOPRAM OXALATE 20 MG/1
20 TABLET ORAL DAILY
Qty: 90 TABLET | Refills: 3 | Status: SHIPPED | OUTPATIENT
Start: 2022-02-04 | End: 2022-05-14 | Stop reason: SDUPTHER

## 2022-05-14 RX ORDER — ESCITALOPRAM OXALATE 20 MG/1
20 TABLET ORAL DAILY
Qty: 90 TABLET | Refills: 0 | Status: SHIPPED | OUTPATIENT
Start: 2022-05-14 | End: 2022-08-23 | Stop reason: SDUPTHER

## 2022-08-23 RX ORDER — ESCITALOPRAM OXALATE 20 MG/1
20 TABLET ORAL DAILY
Qty: 90 TABLET | Refills: 0 | Status: SHIPPED | OUTPATIENT
Start: 2022-08-23 | End: 2022-11-28 | Stop reason: SDUPTHER

## 2022-11-28 RX ORDER — ESCITALOPRAM OXALATE 20 MG/1
20 TABLET ORAL DAILY
Qty: 30 TABLET | Refills: 0 | Status: SHIPPED | OUTPATIENT
Start: 2022-11-28 | End: 2022-12-29

## 2022-12-29 ENCOUNTER — OFFICE VISIT (OUTPATIENT)
Dept: FAMILY MEDICINE CLINIC | Facility: CLINIC | Age: 27
End: 2022-12-29

## 2022-12-29 VITALS
DIASTOLIC BLOOD PRESSURE: 62 MMHG | SYSTOLIC BLOOD PRESSURE: 112 MMHG | BODY MASS INDEX: 26.37 KG/M2 | HEIGHT: 73 IN | WEIGHT: 199 LBS | OXYGEN SATURATION: 96 % | HEART RATE: 100 BPM | RESPIRATION RATE: 16 BRPM | TEMPERATURE: 97.5 F

## 2022-12-29 DIAGNOSIS — M79.662 BILATERAL CALF PAIN: ICD-10-CM

## 2022-12-29 DIAGNOSIS — M62.9 MUSCULOSKELETAL DISORDER INVOLVING UPPER TRAPEZIUS MUSCLE: ICD-10-CM

## 2022-12-29 DIAGNOSIS — F41.1 GENERALIZED ANXIETY DISORDER: ICD-10-CM

## 2022-12-29 DIAGNOSIS — M25.562 CHRONIC PAIN OF BOTH KNEES: ICD-10-CM

## 2022-12-29 DIAGNOSIS — M79.641 PAIN IN BOTH HANDS: ICD-10-CM

## 2022-12-29 DIAGNOSIS — M79.642 PAIN IN BOTH HANDS: ICD-10-CM

## 2022-12-29 DIAGNOSIS — G89.29 CHRONIC PAIN OF BOTH KNEES: ICD-10-CM

## 2022-12-29 DIAGNOSIS — M25.561 CHRONIC PAIN OF BOTH KNEES: ICD-10-CM

## 2022-12-29 DIAGNOSIS — F32.5 DEPRESSION, MAJOR, IN REMISSION: Primary | ICD-10-CM

## 2022-12-29 DIAGNOSIS — M79.661 BILATERAL CALF PAIN: ICD-10-CM

## 2022-12-29 PROCEDURE — 99213 OFFICE O/P EST LOW 20 MIN: CPT | Performed by: PHYSICIAN ASSISTANT

## 2022-12-29 RX ORDER — ESCITALOPRAM OXALATE 10 MG/1
10 TABLET ORAL DAILY
Qty: 90 TABLET | Refills: 3 | Status: SHIPPED | OUTPATIENT
Start: 2022-12-29

## 2022-12-29 NOTE — PROGRESS NOTES
"Subjective   Renaldo Worrell is a 27 y.o. male.     History of Present Illness       Renaldo Worrell male 27 y.o., /62 (BP Location: Left arm, Patient Position: Sitting, Cuff Size: Adult)   Pulse 100   Temp 97.5 °F (36.4 °C) (Oral)   Resp 16   Ht 185.4 cm (72.99\")   Wt 90.3 kg (199 lb)   SpO2 96%   BMI 26.26 kg/m²   who presents today for follow up of Depression and Anxiety.  He reports Lexapro 20mg controls anxiety and depression, just flat in last few mos---less stress at work. . Onset of symptoms was approximately several years ago. He denies current suicidal and homicidal ideation. Risk factors are family history of anxiety and or depression and lifestyle of multiple roles.  Previous treatment includes current Rx. He complains of the following medication side effects:flat on 20mg dose. The patient declines to go to counseling..    Weight is up  Has prior knee sprains---daily knee pain---no prior surgery  Knee and trapezius pain; tennis elbow  Sore and tight in legs--worse after prolonged standing  Back of hands get sore ---more from repetitive ROM----    The following portions of the patient's history were reviewed and updated as appropriate: allergies, current medications, past family history, past medical history, past social history, past surgical history and problem list.    Review of Systems   Constitutional: Negative for activity change, appetite change and unexpected weight change.   HENT: Negative for nosebleeds and trouble swallowing.    Eyes: Negative for pain and visual disturbance.   Respiratory: Negative for chest tightness, shortness of breath and wheezing.    Cardiovascular: Negative for chest pain and palpitations.   Gastrointestinal: Negative for abdominal pain and blood in stool.   Endocrine: Negative.    Genitourinary: Negative for difficulty urinating and hematuria.   Musculoskeletal: Positive for arthralgias, myalgias, neck pain and neck stiffness.   Skin: Negative for color " change and rash.   Allergic/Immunologic: Negative.    Neurological: Negative for syncope and speech difficulty.   Hematological: Negative for adenopathy.   Psychiatric/Behavioral: Negative for agitation, confusion and dysphoric mood. The patient is not nervous/anxious.    All other systems reviewed and are negative.      Objective   Physical Exam  Vitals and nursing note reviewed.   Constitutional:       General: He is not in acute distress.     Appearance: Normal appearance. He is well-developed. He is not diaphoretic.   HENT:      Head: Normocephalic.   Eyes:      General: No scleral icterus.     Conjunctiva/sclera: Conjunctivae normal.      Pupils: Pupils are equal, round, and reactive to light.   Cardiovascular:      Rate and Rhythm: Normal rate and regular rhythm.      Heart sounds: Normal heart sounds. No murmur heard.  Pulmonary:      Effort: Pulmonary effort is normal.      Breath sounds: Normal breath sounds. No rales.   Musculoskeletal:         General: Normal range of motion.      Cervical back: Normal range of motion and neck supple.      Right lower leg: No edema.      Left lower leg: No edema.   Skin:     General: Skin is warm and dry.      Findings: No rash.   Neurological:      Mental Status: He is alert and oriented to person, place, and time.   Psychiatric:         Mood and Affect: Mood normal. Affect is not inappropriate.         Behavior: Behavior normal.         Thought Content: Thought content normal.         Judgment: Judgment normal.         Assessment & Plan   Diagnoses and all orders for this visit:    1. Depression, major, in remission (HCC) (Primary)    2. Generalized anxiety disorder    3. Chronic pain of both knees  -     Ambulatory Referral to Orthopedic Surgery  -     Ambulatory Referral to Physical Therapy Evaluate and treat    4. Musculoskeletal disorder involving upper trapezius muscle  -     Ambulatory Referral to Orthopedic Surgery  -     Ambulatory Referral to Physical Therapy  Evaluate and treat    5. Pain in both hands  -     Ambulatory Referral to Orthopedic Surgery  -     Ambulatory Referral to Physical Therapy Evaluate and treat    6. Bilateral calf pain  -     Ambulatory Referral to Orthopedic Surgery  -     Ambulatory Referral to Physical Therapy Evaluate and treat      Go down on Lexapro to 10mg daily--flat on 20mg; works for anxiety and depression  Refer to ortho   Agree to refer PT---concern with muscle type pain calf bilat and trapezius---exercises may help.   See ortho about chronic knee pain bilat  Can try tennis elbow strap PRN flare up  Wait on muscle relaxer  Ice PRN

## 2023-03-10 NOTE — PROGRESS NOTES
[facility]  New Problem      Patient Name: Renaldo Worrell  : 1995  Primary Care Physician: Sharon Nuñez PA-C        Chief Complaint: Bilateral knee pain, general musculoskeletal pain    HPI:   Renaldo Worrell is a 27 y.o. year old who presents today for evaluation of bilateral knee pain.  Patient reports several areas of concern related to musculoskeletal pain.  He feels like he has unstable joints and hypermobility. Knees have always been a bit of a problem, clicking and popping. In more recent years- the primary issue that they are always uncomfortable. Worse with walking and standing for long periods. Some knee sprains in the past rock climbing, some ankle sprains, nothing that required any formal work-up or an MRI typically improved with conservative care.  He is also been treated for tennis elbow and shoulder issues.     In the past he is done some physical therapy for his tennis elbow never really did anything for the knee pain or discomfort.  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  He really enjoys being active he feels better when he is active but has had some issues maintaining his activity levels given his pain.  No specific symptoms of locking or catching.  He will take anti-inflammatories as needed but nothing on a daily or long-term basis.    Denies any family history of rheumatic illnesses.  No fevers or chills.  No obvious joint swelling that he is aware of in the past.    Past Medical/Surgical, Social and Family History:  I have reviewed and/or updated pertinent history as noted in the medical record including:  History reviewed. No pertinent past medical history.  No past surgical history on file.  Social History     Occupational History   • Not on file   Tobacco Use   • Smoking status: Never   • Smokeless tobacco: Never   Substance and Sexual Activity   • Alcohol use: Defer   • Drug use: No   • Sexual activity: Defer          Allergies: No Known Allergies    Medications:    Home Medications:  Current Outpatient Medications on File Prior to Visit   Medication Sig   • escitalopram (Lexapro) 10 MG tablet Take 1 tablet by mouth Daily. For anxiety and depression     No current facility-administered medications on file prior to visit.         ROS:  14 point review of systems was negative except as listed in the HPI.    Physical Exam:   27 y.o. male  Body mass index is 25.6 kg/m²., 88 kg (194 lb)  Vitals:    03/13/23 1424   Temp: 98.1 °F (36.7 °C)     General: Alert, cooperative, appears well and in no observable distress. Appears stated age and BMI as listed above.  HEENT: Normocephalic, atraumatic on external visual inspection.  CV: No significant peripheral edema.  Respiratory: Normal respiratory effort.  Skin: Warm & well perfused; appropriate skin turgor.  Psych: Appropriate mood & affect.  Neuro: Gross sensation and motor intact in affected extremity/extremities.  Vascular: Peripheral pulses palpable in affected extremity/extremities.     MSK Exam:  Physical exam of both knees reveal no obvious deformities or wounds.  No effusion.  Calves are soft and nontender.  He has full active range of motion, passively he exhibits some hyperextension approximately 5 degrees or so bilaterally..  His flexion is to 135 degrees bilaterally.  Ligamentous exam is grossly stable.  He does have a little bit of increased opening with varus stress but still maintains a firm endpoint in both knees.  Negative anterior posterior drawer.  Patella are hypermobile bilaterally with increased medial translation but no obvious pain.  Does have some crepitus with range of motion.    Radiology:    The following X-rays were ordered/reviewed today to evaluate the patient's symptoms: Bilateral Knee: AP standing, lateral, and sunrise of both knees show No obvious acute bony pathology.  Joint spaces are well-maintained.  He does not have any significant degenerative changes.  Patella appears well-seated in the groove  bilaterally..    Procedure:   N/A    Misc. Data/Labs: N/A    Assessment & Plan:    ICD-10-CM ICD-9-CM   1. Patellofemoral pain syndrome of both knees  M22.2X1 719.46    M22.2X2    2. Chronic pain of both knees  M25.561 719.46    M25.562 338.29    G89.29    3. Hypermobile joints  M24.9 718.80     No orders of the defined types were placed in this encounter.    Orders Placed This Encounter   Procedures   • XR Knee 3 View Bilateral   • Ambulatory Referral to Physical Therapy Evaluate and treat       This is a 27-year-old male with several musculoskeletal complaints.  I do suspect hypermobility, is at least in part to blame for his symptoms.  Currently his knee pain is most suggestive of patellofemoral syndrome, he does have some hypermobility of the patella.  I think the mainstay of his treatment will be physical therapy and working on strengthening to better protect his joints as he ages.  May be beneficial for him at some point to have further work-up for the hyper mobility, we can certainly discuss that with primary care.    Given the somewhat constant nature of his pain symptoms I would not recommend any long-term anti-inflammatories but he could certainly use those on an as-needed basis.  I will give him a referral to physical therapy and I will see him back in about 4 weeks we can touch base about his knee pain.  He also like to address his shoulders at some point we can certainly look into that as well.  If he does not find any improvement in his symptoms with conservative care we can certainly order an MRI just to rule out any structural sources for his symptoms.    Return in about 4 weeks (around 4/10/2023).    Patient encouraged to call with questions or concerns prior to follow up.  Recommend ICE and/or HEAT PRN as discussed.  Will discuss with attending physician as needed.  Consider additional referrals, work up and/or advanced imaging as indicated or if patient fails to respond to conservative  care.        Je Stapleton, APRN

## 2023-03-13 ENCOUNTER — OFFICE VISIT (OUTPATIENT)
Dept: ORTHOPEDIC SURGERY | Facility: CLINIC | Age: 28
End: 2023-03-13
Payer: COMMERCIAL

## 2023-03-13 VITALS — TEMPERATURE: 98.1 F | BODY MASS INDEX: 25.71 KG/M2 | WEIGHT: 194 LBS | HEIGHT: 73 IN

## 2023-03-13 DIAGNOSIS — M24.9 HYPERMOBILE JOINTS: ICD-10-CM

## 2023-03-13 DIAGNOSIS — M25.561 CHRONIC PAIN OF BOTH KNEES: ICD-10-CM

## 2023-03-13 DIAGNOSIS — G89.29 CHRONIC PAIN OF BOTH KNEES: ICD-10-CM

## 2023-03-13 DIAGNOSIS — M22.2X2 PATELLOFEMORAL PAIN SYNDROME OF BOTH KNEES: Primary | ICD-10-CM

## 2023-03-13 DIAGNOSIS — M22.2X1 PATELLOFEMORAL PAIN SYNDROME OF BOTH KNEES: Primary | ICD-10-CM

## 2023-03-13 DIAGNOSIS — M25.562 CHRONIC PAIN OF BOTH KNEES: ICD-10-CM

## 2023-03-13 PROCEDURE — 73562 X-RAY EXAM OF KNEE 3: CPT | Performed by: NURSE PRACTITIONER

## 2023-03-13 PROCEDURE — 99204 OFFICE O/P NEW MOD 45 MIN: CPT | Performed by: NURSE PRACTITIONER

## 2023-03-14 ENCOUNTER — PATIENT ROUNDING (BHMG ONLY) (OUTPATIENT)
Dept: ORTHOPEDIC SURGERY | Facility: CLINIC | Age: 28
End: 2023-03-14
Payer: COMMERCIAL

## 2023-03-14 NOTE — PROGRESS NOTES
A [employer] Message has been sent to the patient for PATIENT ROUNDING with [facility]

## 2023-04-24 ENCOUNTER — OFFICE VISIT (OUTPATIENT)
Dept: ORTHOPEDIC SURGERY | Facility: CLINIC | Age: 28
End: 2023-04-24
Payer: COMMERCIAL

## 2023-04-24 VITALS — BODY MASS INDEX: 25.71 KG/M2 | WEIGHT: 194 LBS | TEMPERATURE: 97.6 F | HEIGHT: 73 IN

## 2023-04-24 DIAGNOSIS — G89.29 CHRONIC PAIN OF BOTH SHOULDERS: ICD-10-CM

## 2023-04-24 DIAGNOSIS — M24.9 HYPERMOBILE JOINTS: ICD-10-CM

## 2023-04-24 DIAGNOSIS — M25.562 CHRONIC PAIN OF BOTH KNEES: ICD-10-CM

## 2023-04-24 DIAGNOSIS — M25.512 CHRONIC PAIN OF BOTH SHOULDERS: ICD-10-CM

## 2023-04-24 DIAGNOSIS — M25.511 CHRONIC PAIN OF BOTH SHOULDERS: ICD-10-CM

## 2023-04-24 DIAGNOSIS — M25.561 CHRONIC PAIN OF BOTH KNEES: ICD-10-CM

## 2023-04-24 DIAGNOSIS — M75.40 IMPINGEMENT SYNDROME OF SHOULDER REGION, UNSPECIFIED LATERALITY: Primary | ICD-10-CM

## 2023-04-24 DIAGNOSIS — M22.2X2 PATELLOFEMORAL PAIN SYNDROME OF BOTH KNEES: ICD-10-CM

## 2023-04-24 DIAGNOSIS — M22.2X1 PATELLOFEMORAL PAIN SYNDROME OF BOTH KNEES: ICD-10-CM

## 2023-04-24 DIAGNOSIS — G89.29 CHRONIC PAIN OF BOTH KNEES: ICD-10-CM

## 2023-04-24 NOTE — PROGRESS NOTES
[facility]              Follow Up      Patient Name: Renaldo Worrell  : 1995  Primary Care Physician: Sharon Nuñez PA-C        Chief Complaint: Bilateral shoulder pain, knee pain follow-up    HPI:   Renaldo Worrell is a 27 y.o. year old who presents today for evaluation of bilateral shoulder pain and follow-up for bilateral knee pain.  I last saw the patient approximately 6 weeks ago with complaints of bilateral knee pain and hypermobility.  Suspected his symptoms were patellofemoral we sent him for physical therapy he has been working with therapy on mobility- hip circles. Squats and lunges. Got some braces when hiking at work. Got new shoes.  Overall his knees are doing much better he is pleased with his progress thus far and continues to do his home exercises.  He is here today really to talk about his shoulder pain with new x-rays for further evaluation and treatment.    Shoulder pain- bilateral. No real HS pain. Occasional pain if side sleeping. RHD. Left maybe a little worse than R. Lateral deltoid, sometimes arm pit pain if leaning over the couch with arm abducted. Holding weight at chest hurts front of the shoulders. Gets popping and cracking.  History of hypermobility    He went canoeing several years ago he says his paddle was a little too short for him this seemed to be when his symptoms really started getting worse.  No dedicated neck pain no numbness or tingling.        Past Medical/Surgical, Social and Family History:  I have reviewed and/or updated pertinent history as noted in the medical record including:  History reviewed. No pertinent past medical history.  No past surgical history on file.  Social History     Occupational History   • Not on file   Tobacco Use   • Smoking status: Never   • Smokeless tobacco: Never   Substance and Sexual Activity   • Alcohol use: Defer   • Drug use: No   • Sexual activity: Defer          Allergies: No Known Allergies    Medications:   Home  Medications:  Current Outpatient Medications on File Prior to Visit   Medication Sig   • escitalopram (Lexapro) 10 MG tablet Take 1 tablet by mouth Daily. For anxiety and depression     No current facility-administered medications on file prior to visit.         ROS:  ROS negative except as listed in the HPI.    Physical Exam:   27 y.o. male  Body mass index is 25.6 kg/m²., 88 kg (194 lb)  Vitals:    04/24/23 1457   Temp: 97.6 °F (36.4 °C)     General: Alert, cooperative, appears well and in no observable distress. Appears stated age and BMI as listed above.  HEENT: Normocephalic, atraumatic on external visual inspection.  CV: No significant peripheral edema.  Respiratory: Normal respiratory effort.  Skin: Warm & well perfused; appropriate skin turgor.  Psych: Appropriate mood & affect.  Neuro: Gross sensation and motor intact in affected extremity/extremities.  Vascular: Peripheral pulses palpable in affected extremity/extremities.     MSK Exam:    Bilateral Shoulder  No obvious deformities or wounds.   No redness or significant swelling.  +Definitive painful arc.  +Impingement signs  Active range of motion demonstrates hypermobility  Flexion> 180    IR mid thoracic spine  Strength is 4+/5 strength with isometric testing of the rotator cuff and uncomfortable  He does have subtle scapular winging on the right more so than the left.    Brief examination of the cervical spine did not reproduce any specific radicular pattern or symptoms.   Strength is reasonable and symmetric in the upper extremities.       Radiology:    The following X-rays were ordered/reviewed today to evaluate the patient's symptoms: Shoulder: AP, Scapular Y and Axillary Lateral of both shoulder(s) show No obvious acute bony pathology.  His right shoulder has just a bit of subchondral sclerosis at the greater tuberosity, acromion that overlaps the tuberosity on the right more so than the left.  No degenerative changes otherwise.    Procedure:    N/A      Misc. Data/Labs: N/A    Assessment & Plan:    ICD-10-CM ICD-9-CM   1. Impingement syndrome of shoulder region, unspecified laterality  M75.40 726.2   2. Chronic pain of both shoulders  M25.511 719.41    G89.29 338.29    M25.512    3. Patellofemoral pain syndrome of both knees  M22.2X1 719.46    M22.2X2    4. Chronic pain of both knees  M25.561 719.46    M25.562 338.29    G89.29    5. Hypermobile joints  M24.9 718.80     No orders of the defined types were placed in this encounter.    Orders Placed This Encounter   Procedures   • XR Shoulder 2+ View Bilateral   • Ambulatory Referral to Physical Therapy Evaluate and treat     This is a 27-year-old male with some hypermobility.  I suspect his shoulder pain is impingement.  He does have a little bit of scapular winging on the right shoulder.  The acromion appears as though he could be high risk for some secondary impingement especially on the right side.  His strength is good, I do not suspect any specific labral pathology or cuff tear.  I will plan to send him back for some physical therapy to work on his upper extremity strength scapular stabilization etc.  He will continue with anti-inflammatories as needed activity modifications.  We will see him back in about 6 weeks if he fails to make adequate improvements we will consider additional means of testing such as an MRI.    Return in about 6 weeks (around 6/5/2023).    Patient encouraged to call with questions or concerns prior to follow up.  Recommend ICE and/or HEAT PRN as discussed.  Will discuss with attending physician as needed.  Consider additional referrals, work up and/or advanced imaging as indicated or if patient fails to respond to conservative care.        Je Stapleton, DAVID      Dictated Utilizing Dragon Dictation

## 2023-06-16 RX ORDER — ESCITALOPRAM OXALATE 10 MG/1
10 TABLET ORAL DAILY
Qty: 90 TABLET | Refills: 0 | Status: SHIPPED | OUTPATIENT
Start: 2023-06-16

## 2023-10-31 RX ORDER — ESCITALOPRAM OXALATE 10 MG/1
10 TABLET ORAL DAILY
Qty: 30 TABLET | Refills: 0 | Status: SHIPPED | OUTPATIENT
Start: 2023-10-31

## 2023-12-10 RX ORDER — ESCITALOPRAM OXALATE 10 MG/1
10 TABLET ORAL DAILY
Qty: 15 TABLET | Refills: 0 | Status: SHIPPED | OUTPATIENT
Start: 2023-12-10

## 2024-01-03 ENCOUNTER — OFFICE VISIT (OUTPATIENT)
Dept: FAMILY MEDICINE CLINIC | Facility: CLINIC | Age: 29
End: 2024-01-03
Payer: COMMERCIAL

## 2024-01-03 VITALS
OXYGEN SATURATION: 96 % | WEIGHT: 197 LBS | HEART RATE: 96 BPM | RESPIRATION RATE: 16 BRPM | SYSTOLIC BLOOD PRESSURE: 106 MMHG | DIASTOLIC BLOOD PRESSURE: 68 MMHG | HEIGHT: 73 IN | BODY MASS INDEX: 26.11 KG/M2 | TEMPERATURE: 97.4 F

## 2024-01-03 DIAGNOSIS — F41.1 GENERALIZED ANXIETY DISORDER: Primary | ICD-10-CM

## 2024-01-03 DIAGNOSIS — F32.5 DEPRESSION, MAJOR, IN REMISSION: ICD-10-CM

## 2024-01-03 PROCEDURE — 99213 OFFICE O/P EST LOW 20 MIN: CPT | Performed by: PHYSICIAN ASSISTANT

## 2024-01-03 RX ORDER — ESCITALOPRAM OXALATE 10 MG/1
10 TABLET ORAL DAILY
Qty: 90 TABLET | Refills: 3 | Status: SHIPPED | OUTPATIENT
Start: 2024-01-03

## 2024-01-03 NOTE — PROGRESS NOTES
"Subjective   Renaldo Worrell is a 28 y.o. male.     Anxiety  Patient reports no nervous/anxious behavior or suicidal ideas.     His past medical history is significant for depression.   DepressionPatient is not experiencing: choking sensation, nervousness/anxiety and suicidal ideas.           Renaldo Worrell male 28 y.o., /68   Pulse 119   Temp 97.4 °F (36.3 °C)   Resp 16   Ht 185.4 cm (73\")   Wt 89.4 kg (197 lb)   SpO2 96%   BMI 25.99 kg/m²   who presents today for follow up of Depression and Anxiety.  He reports medication is working well, patient desires to continue on Rx, and needs refill. Onset of symptoms was approximately several years ago. He denies current suicidal and homicidal ideation. Risk factors are family history of anxiety and or depression and lifestyle of multiple roles.  Previous treatment includes current Rx. He complains of the following medication side effects:none. The patient is currently in counseling..  Lexapro 10mg ----dose is working  Felt flat on 20mg Lexapro    Did see ortho for bilat shoulder pain---saw Je Stapleton----went to ER  And checked left knee    The following portions of the patient's history were reviewed and updated as appropriate: allergies, current medications, past family history, past medical history, past social history, past surgical history, and problem list.    Review of Systems   Constitutional:  Negative for diaphoresis.   HENT:  Negative for nosebleeds and trouble swallowing.    Eyes:  Negative for blurred vision and visual disturbance.   Respiratory:  Negative for choking.    Gastrointestinal:  Negative for blood in stool.   Allergic/Immunologic: Negative for immunocompromised state.   Neurological:  Negative for facial asymmetry and speech difficulty.   Psychiatric/Behavioral:  Negative for dysphoric mood, self-injury and suicidal ideas. The patient is not nervous/anxious.        Objective   Physical Exam  Vitals and nursing note reviewed. "   Constitutional:       General: He is not in acute distress.     Appearance: He is well-developed. He is not diaphoretic.   HENT:      Head: Normocephalic.   Eyes:      Conjunctiva/sclera: Conjunctivae normal.      Pupils: Pupils are equal, round, and reactive to light.   Cardiovascular:      Rate and Rhythm: Normal rate and regular rhythm.      Pulses: Normal pulses.      Heart sounds: Normal heart sounds. No murmur heard.  Pulmonary:      Effort: Pulmonary effort is normal.      Breath sounds: Normal breath sounds. No rales.   Musculoskeletal:         General: Normal range of motion.      Cervical back: Normal range of motion and neck supple.   Skin:     General: Skin is warm and dry.      Findings: No rash.   Neurological:      Mental Status: He is alert and oriented to person, place, and time.   Psychiatric:         Mood and Affect: Mood normal. Affect is not inappropriate.         Behavior: Behavior normal.         Thought Content: Thought content normal.         Judgment: Judgment normal.           Assessment & Plan   Diagnoses and all orders for this visit:    1. Generalized anxiety disorder (Primary)    2. Depression, major, in remission    Other orders  -     escitalopram (Lexapro) 10 MG tablet; Take 1 tablet by mouth Daily. For anxiety and depression  Dispense: 90 tablet; Refill: 3      Lexapro working great---for anxiety and depression             Answers submitted by the patient for this visit:  Primary Reason for Visit (Submitted on 1/3/2024)  What is the primary reason for your visit?: Other  Other (Submitted on 1/3/2024)  Please describe your symptoms.: No symptoms. Just a check up.  Have you had these symptoms before?: No  How long have you been having these symptoms?: 1-4 days

## 2024-04-28 RX ORDER — ESCITALOPRAM OXALATE 10 MG/1
10 TABLET ORAL DAILY
Qty: 90 TABLET | Refills: 3 | Status: SHIPPED | OUTPATIENT
Start: 2024-04-28

## 2024-08-11 RX ORDER — ESCITALOPRAM OXALATE 10 MG/1
10 TABLET ORAL DAILY
Qty: 90 TABLET | Refills: 0 | Status: SHIPPED | OUTPATIENT
Start: 2024-08-11

## 2024-11-24 RX ORDER — ESCITALOPRAM OXALATE 10 MG/1
10 TABLET ORAL DAILY
Qty: 90 TABLET | Refills: 0 | Status: SHIPPED | OUTPATIENT
Start: 2024-11-24

## 2025-04-04 RX ORDER — ESCITALOPRAM OXALATE 10 MG/1
10 TABLET ORAL DAILY
Qty: 90 TABLET | Refills: 0 | OUTPATIENT
Start: 2025-04-04

## 2025-04-08 ENCOUNTER — TELEPHONE (OUTPATIENT)
Dept: FAMILY MEDICINE CLINIC | Facility: CLINIC | Age: 30
End: 2025-04-08
Payer: COMMERCIAL

## 2025-04-08 NOTE — TELEPHONE ENCOUNTER
Patient is requesting a medication refill for escitalopram (Lexapro) 10 MG tablet    Patient stated that he has enough to last the rest of the week    Patient was last seen on 1/3/24  Patient's up coming appointment 5/12/25

## 2025-04-09 RX ORDER — ESCITALOPRAM OXALATE 10 MG/1
10 TABLET ORAL DAILY
Qty: 90 TABLET | Refills: 0 | Status: SHIPPED | OUTPATIENT
Start: 2025-04-09

## 2025-04-09 NOTE — TELEPHONE ENCOUNTER
Called and spoke with patient to inform him that Lexapro has been sent to his pharmacy. Pt verbalized understanding

## 2025-06-16 ENCOUNTER — OFFICE VISIT (OUTPATIENT)
Dept: FAMILY MEDICINE CLINIC | Facility: CLINIC | Age: 30
End: 2025-06-16
Payer: COMMERCIAL

## 2025-06-16 VITALS
OXYGEN SATURATION: 97 % | HEIGHT: 73 IN | BODY MASS INDEX: 26.03 KG/M2 | SYSTOLIC BLOOD PRESSURE: 116 MMHG | TEMPERATURE: 98.4 F | WEIGHT: 196.4 LBS | RESPIRATION RATE: 16 BRPM | HEART RATE: 89 BPM | DIASTOLIC BLOOD PRESSURE: 76 MMHG

## 2025-06-16 DIAGNOSIS — F33.42 RECURRENT MAJOR DEPRESSIVE DISORDER, IN FULL REMISSION: ICD-10-CM

## 2025-06-16 DIAGNOSIS — F41.1 GENERALIZED ANXIETY DISORDER: Primary | ICD-10-CM

## 2025-06-16 DIAGNOSIS — L98.9 SKIN LESIONS: ICD-10-CM

## 2025-06-16 PROCEDURE — 99213 OFFICE O/P EST LOW 20 MIN: CPT | Performed by: PHYSICIAN ASSISTANT

## 2025-06-16 RX ORDER — ESCITALOPRAM OXALATE 10 MG/1
10 TABLET ORAL DAILY
Qty: 90 TABLET | Refills: 3 | Status: SHIPPED | OUTPATIENT
Start: 2025-06-16

## 2025-06-16 NOTE — PROGRESS NOTES
"Subjective   Renaldo Worrell is a 29 y.o. male.     Abrasion  Pertinent negative symptoms include no diaphoresis.      Renaldo Worrell male 29 y.o., /76 (BP Location: Left arm, Patient Position: Sitting, Cuff Size: Adult)   Pulse 89   Temp 98.4 °F (36.9 °C) (Oral)   Resp 16   Ht 185.4 cm (72.99\")   Wt 89.1 kg (196 lb 6.4 oz)   SpO2 97%   BMI 25.92 kg/m²   who presents today for follow up of Depression and Anxiety.  He reports medication is working well, patient desires to continue on Rx, and needs refill. Onset of symptoms was approximately several years ago. He denies current suicidal and homicidal ideation. Risk factors are family history of anxiety and or depression and lifestyle of multiple roles.  Previous treatment includes medication (SSRI). He complains of the following medication side effects:none. The patient has previously been in counseling..  Felt flat on 20 mg    Concern with skin lesions left shoulder---several freckles also.    The following portions of the patient's history were reviewed and updated as appropriate: allergies, current medications, past family history, past medical history, past social history, past surgical history, and problem list.    Review of Systems   Constitutional:  Negative for diaphoresis.   HENT:  Negative for nosebleeds and trouble swallowing.    Eyes:  Negative for blurred vision and visual disturbance.   Respiratory:  Negative for choking.    Gastrointestinal:  Negative for blood in stool.   Allergic/Immunologic: Negative for immunocompromised state.   Neurological:  Negative for facial asymmetry and speech difficulty.   Psychiatric/Behavioral:  Negative for self-injury and suicidal ideas.        Objective   Physical Exam  Vitals and nursing note reviewed.   Constitutional:       General: He is not in acute distress.     Appearance: Normal appearance. He is well-developed. He is not diaphoretic.   HENT:      Head: Normocephalic.      Right Ear: External ear " normal.      Left Ear: External ear normal.   Eyes:      General: No scleral icterus.     Conjunctiva/sclera: Conjunctivae normal.      Pupils: Pupils are equal, round, and reactive to light.   Cardiovascular:      Rate and Rhythm: Normal rate and regular rhythm.      Heart sounds: Normal heart sounds. No murmur heard.  Pulmonary:      Effort: Pulmonary effort is normal.      Breath sounds: Normal breath sounds.   Musculoskeletal:         General: Normal range of motion.      Cervical back: Normal range of motion and neck supple.   Skin:     General: Skin is warm and dry.      Findings: Lesion present.      Comments: Frejustin---more on shoulders  Has three 1 mm raised hypopigmented lesions and refer to dermatology   Neurological:      Mental Status: He is alert and oriented to person, place, and time.   Psychiatric:         Mood and Affect: Mood normal. Affect is not inappropriate.         Behavior: Behavior normal.         Thought Content: Thought content normal.         Judgment: Judgment normal.           Assessment & Plan   Diagnoses and all orders for this visit:    1. Generalized anxiety disorder (Primary)    2. Recurrent major depressive disorder, in full remission    3. Skin lesions  -     Ambulatory Referral to Dermatology    Other orders  -     escitalopram (Lexapro) 10 MG tablet; Take 1 tablet by mouth Daily. For anxiety and depression  Dispense: 90 tablet; Refill: 3      Can update labs if need  Satisfied with Lexapro treating anxiety and depression working well and goals are met at the 10 mg dose  Concern with 3 lesions left shoulder referred to dermatology

## 2025-08-03 RX ORDER — ESCITALOPRAM OXALATE 10 MG/1
10 TABLET ORAL DAILY
Qty: 90 TABLET | Refills: 3 | Status: SHIPPED | OUTPATIENT
Start: 2025-08-03